# Patient Record
Sex: MALE | Race: WHITE
[De-identification: names, ages, dates, MRNs, and addresses within clinical notes are randomized per-mention and may not be internally consistent; named-entity substitution may affect disease eponyms.]

---

## 2019-07-05 ENCOUNTER — HOSPITAL ENCOUNTER (OUTPATIENT)
Dept: HOSPITAL 74 - FASU | Age: 24
Discharge: HOME | End: 2019-07-05
Attending: ORTHOPAEDIC SURGERY
Payer: COMMERCIAL

## 2019-07-05 VITALS — TEMPERATURE: 97.5 F

## 2019-07-05 VITALS — BODY MASS INDEX: 25.8 KG/M2

## 2019-07-05 VITALS — SYSTOLIC BLOOD PRESSURE: 118 MMHG | HEART RATE: 68 BPM | DIASTOLIC BLOOD PRESSURE: 71 MMHG

## 2019-07-05 DIAGNOSIS — M65.811: ICD-10-CM

## 2019-07-05 DIAGNOSIS — M19.011: Primary | ICD-10-CM

## 2019-07-05 PROCEDURE — 0PB94ZZ EXCISION OF RIGHT CLAVICLE, PERCUTANEOUS ENDOSCOPIC APPROACH: ICD-10-PCS | Performed by: ORTHOPAEDIC SURGERY

## 2019-07-05 NOTE — DS
Physical Examination


Vital Signs: 


 Vital Signs











Temperature  98.2 F   07/05/19 06:30


 


Pulse Rate  68   07/05/19 06:30


 


Respiratory Rate  18   07/05/19 06:30


 


Blood Pressure  106/75   07/05/19 06:30


 


O2 Sat by Pulse Oximetry (%)  97   07/05/19 06:30














Discharge Summary


Reason For Visit: RIGHT SHOULDER AC JOINT OA


Condition: Good





- Instructions


Diet, Activity, Other Instructions: 


                 Post Operative Instructions: Shoulder Arthroscopy


                           Dr Jalil Ann  [phone]





1. Pain following a Shoulder Arthroscopy is variable and can be significant. 

Some 


    patients will have more pain than others. You have been provided with a 


    prescription for medication that contains a narcotic. You are not allowed to


    drive while on this medication. You should take Tylenol (Acetaminophen)


    when taking the pain medication ( it will result in an overdose). You 

should also take 


    medications such as Ibuprofen or Naprosyn in addition to the pain medicine 

if 


    you do not have any problems with the NSAID class of medications. 





2. Apply ice to the shoulder for 15 minutes every hour. You may continue this 

for


    as many days as necessary.





3. You may find sleeping on an incline (reclining chair) to be more comfortable


    for the first few days.





4.  You may remove your sling when the arm is comfortable.





5. You may use the arm as tolerated.





6. You may remove the bandages in 48 hours. You may shower at that point.


    


7. Place band-aids on the glue after your shower.Do not put any creams


   or lotions on the incisions. 





8. Please call the office to schedule a visit to have your sutures removed.





9. If for any reason you believe you may have an infection or are concerned,


   please feel free to call me. I can be reached through our office number 24 


   hours a day.





10. Please call our office with any questions; we will review the surgical 

findings


    during your post-operative visit.


     


Disposition: HOME





- Home Medications


Comprehensive Discharge Medication List: 


Ambulatory Orders





NK [No Known Home Medication]  06/25/19

## 2019-07-05 NOTE — OP
Operative Note





- Note:


Operative Date: 07/05/19


Pre-Operative Diagnosis: Right shoulder ACJ Joint arthrosis


Operation: RSA and DCE


Post-Operative Diagnosis: Same as Pre-op


Surgeon: Jalil Ann


Anesthesia: General


Operative Report Dictated: Yes

## 2019-07-05 NOTE — SURG
Surgery First Assist Note


First Assist: Adam Peres PA-C


Date of Service: 07/05/19


Diagnosis: 





Right shoulder ACJ Joint arthrosis





Procedure: 





Right shoulder arthroscopy and DCE


I was present for the entirety of the operative procedure. For further detail, 

please refer to operative report.








Visit type





- Case Type


Case Type: Scheduled





- New patient


This patient is new to me today: Yes


Date on this admission: 07/05/19